# Patient Record
Sex: FEMALE | Race: WHITE | NOT HISPANIC OR LATINO
[De-identification: names, ages, dates, MRNs, and addresses within clinical notes are randomized per-mention and may not be internally consistent; named-entity substitution may affect disease eponyms.]

---

## 2018-02-26 ENCOUNTER — APPOINTMENT (OUTPATIENT)
Dept: SURGERY | Facility: CLINIC | Age: 18
End: 2018-02-26
Payer: COMMERCIAL

## 2018-02-26 ENCOUNTER — LABORATORY RESULT (OUTPATIENT)
Age: 18
End: 2018-02-26

## 2018-02-26 VITALS
BODY MASS INDEX: 23.3 KG/M2 | HEIGHT: 66 IN | DIASTOLIC BLOOD PRESSURE: 66 MMHG | WEIGHT: 145 LBS | SYSTOLIC BLOOD PRESSURE: 107 MMHG | HEART RATE: 86 BPM

## 2018-02-26 PROCEDURE — 99243 OFF/OP CNSLTJ NEW/EST LOW 30: CPT

## 2018-02-26 PROCEDURE — 10022: CPT

## 2018-02-26 PROCEDURE — 76942 ECHO GUIDE FOR BIOPSY: CPT

## 2018-08-29 ENCOUNTER — APPOINTMENT (OUTPATIENT)
Dept: SURGERY | Facility: CLINIC | Age: 18
End: 2018-08-29
Payer: COMMERCIAL

## 2018-08-29 PROCEDURE — 99214 OFFICE O/P EST MOD 30 MIN: CPT

## 2019-02-27 ENCOUNTER — APPOINTMENT (OUTPATIENT)
Dept: SURGERY | Facility: CLINIC | Age: 19
End: 2019-02-27
Payer: COMMERCIAL

## 2019-02-27 ENCOUNTER — LABORATORY RESULT (OUTPATIENT)
Age: 19
End: 2019-02-27

## 2019-02-27 PROCEDURE — 76942 ECHO GUIDE FOR BIOPSY: CPT | Mod: 59

## 2019-02-27 PROCEDURE — 99214 OFFICE O/P EST MOD 30 MIN: CPT | Mod: 25

## 2019-02-27 PROCEDURE — 10005 FNA BX W/US GDN 1ST LES: CPT

## 2019-02-27 NOTE — PHYSICAL EXAM
[de-identified] : no cervical or supraclavicular adenopathy. trachea midline, thyroid full without discreet nodule.  [Normal] : orientation to person, place, and time: normal

## 2019-02-27 NOTE — ASSESSMENT
[FreeTextEntry1] : increase in left lower nodule.  US guided FNA performed, if benign,  f/u US 8/2019 RTo 6 mo

## 2019-02-27 NOTE — HISTORY OF PRESENT ILLNESS
[de-identified] : Patient referred by Dr. Up for evaluation of MNG.  Patient with thyroid nodules noted on US 2012, no biopsy at that time.  Repeat US 2/2/18 right 5 mm nodule , left lower nodule 14 x 7 x 15 mm increased from prior study additional nodule noted. TSH 1.7 free T4 1.3, calcium 10.1.  Patient denies dysphagia, hoarseness or radiation exposure.  \par biopsy 2/2018 benign, f/u US 8/2018 stable MNG.  denies recent illness\par Patient with over 6 year hx of MNG with recent US 2/6/19 with MNG with increase in left lower nodule with suspicious characteristics.  Patient denies  symptoms.

## 2019-08-19 ENCOUNTER — APPOINTMENT (OUTPATIENT)
Dept: SURGERY | Facility: CLINIC | Age: 19
End: 2019-08-19
Payer: COMMERCIAL

## 2019-08-19 PROCEDURE — 99213 OFFICE O/P EST LOW 20 MIN: CPT

## 2019-08-19 NOTE — PHYSICAL EXAM
[Normal] : no neck adenopathy [de-identified] : no cervical or supraclavicular adenopathy. trachea midline, thyroid full without discreet nodule.

## 2019-08-19 NOTE — HISTORY OF PRESENT ILLNESS
[de-identified] : Patient referred by Dr. Up for evaluation of MNG.  Patient with thyroid nodules noted on US 2012, no biopsy at that time.  Repeat US 2/2/18 right 5 mm nodule , left lower nodule 14 x 7 x 15 mm increased from prior study additional nodule noted. TSH 1.7 free T4 1.3, calcium 10.1.  Patient denies dysphagia, hoarseness or radiation exposure.  \par biopsy 2/2018 benign, f/u US 8/2018 stable MNG.  denies recent illness\par Patient with over 6 year hx of MNG with recent US 2/6/19 with MNG with increase in left lower nodule with suspicious characteristics. benign biopsy 2/2019, patient  had f/u US 8/2019 stable MNG .   Patient denies  symptoms.

## 2020-02-26 ENCOUNTER — APPOINTMENT (OUTPATIENT)
Dept: SURGERY | Facility: CLINIC | Age: 20
End: 2020-02-26

## 2020-05-21 ENCOUNTER — APPOINTMENT (OUTPATIENT)
Dept: OPHTHALMOLOGY | Facility: CLINIC | Age: 20
End: 2020-05-21

## 2020-05-27 ENCOUNTER — APPOINTMENT (OUTPATIENT)
Dept: SURGERY | Facility: CLINIC | Age: 20
End: 2020-05-27
Payer: COMMERCIAL

## 2020-05-27 PROCEDURE — 99213 OFFICE O/P EST LOW 20 MIN: CPT

## 2020-05-27 NOTE — HISTORY OF PRESENT ILLNESS
[de-identified] : Patient referred by Dr. Up for evaluation of MNG.  Patient with thyroid nodules noted on US 2012, no biopsy at that time.  Repeat US 2/2/18 right 5 mm nodule , left lower nodule 14 x 7 x 15 mm increased from prior study additional nodule noted. TSH 1.7 free T4 1.3, calcium 10.1.  Patient denies dysphagia, hoarseness or radiation exposure.  \par biopsy 2/2018 benign, f/u US 8/2018 stable MNG.  denies recent illness\par Patient with over 8 year hx of MNG with recent US 2/6/19 with MNG with increase in left lower nodule with suspicious characteristics. benign biopsy 2/2019, patient  had f/u US 5/13/2020  stable MNG .   Patient denies  symptoms. patient due for PE with bishop , will forward results, denies fatigue or change in weight

## 2020-05-27 NOTE — PHYSICAL EXAM
[de-identified] : no cervical or supraclavicular adenopathy. trachea midline, thyroid full without discreet nodule.  [Normal] : no neck adenopathy [de-identified] : Skin:  normal appearance.  no rash, nodules, vesicles, or erythema,\par Musculoskeletal:  full range of motion and no deformities appreciated\par Neurological:  grossly intact\par Psychiatric:  oriented to person, place and time with appropriate affect

## 2020-08-18 ENCOUNTER — APPOINTMENT (OUTPATIENT)
Dept: OPHTHALMOLOGY | Facility: CLINIC | Age: 20
End: 2020-08-18
Payer: COMMERCIAL

## 2020-08-18 ENCOUNTER — NON-APPOINTMENT (OUTPATIENT)
Age: 20
End: 2020-08-18

## 2020-08-18 PROCEDURE — 92014 COMPRE OPH EXAM EST PT 1/>: CPT

## 2020-10-21 ENCOUNTER — RESULT REVIEW (OUTPATIENT)
Age: 20
End: 2020-10-21

## 2022-02-09 ENCOUNTER — APPOINTMENT (OUTPATIENT)
Dept: OBGYN | Facility: CLINIC | Age: 22
End: 2022-02-09
Payer: COMMERCIAL

## 2022-02-09 ENCOUNTER — ASOB RESULT (OUTPATIENT)
Age: 22
End: 2022-02-09

## 2022-02-09 VITALS
HEIGHT: 66 IN | DIASTOLIC BLOOD PRESSURE: 81 MMHG | SYSTOLIC BLOOD PRESSURE: 117 MMHG | BODY MASS INDEX: 23.3 KG/M2 | WEIGHT: 145 LBS

## 2022-02-09 PROCEDURE — 76856 US EXAM PELVIC COMPLETE: CPT

## 2022-02-09 PROCEDURE — 99213 OFFICE O/P EST LOW 20 MIN: CPT

## 2022-02-19 ENCOUNTER — APPOINTMENT (OUTPATIENT)
Dept: ULTRASOUND IMAGING | Facility: CLINIC | Age: 22
End: 2022-02-19

## 2022-02-22 ENCOUNTER — APPOINTMENT (OUTPATIENT)
Dept: SURGERY | Facility: CLINIC | Age: 22
End: 2022-02-22
Payer: COMMERCIAL

## 2022-02-22 PROCEDURE — 99213 OFFICE O/P EST LOW 20 MIN: CPT

## 2022-02-22 NOTE — PHYSICAL EXAM
[de-identified] : no cervical or supraclavicular adenopathy. trachea midline, thyroid full without discreet nodule.  [Normal] : no neck adenopathy [de-identified] : Skin:  normal appearance.  no rash, nodules, vesicles, or erythema,\par Musculoskeletal:  full range of motion and no deformities appreciated\par Neurological:  grossly intact\par Psychiatric:  oriented to person, place and time with appropriate affect

## 2022-02-22 NOTE — ASSESSMENT
[FreeTextEntry1] : minimal change in  MNG with prior FNA benign and probably reactive LNs.    f/u US 5/2022  if nodule continues to enlarge will plan repeat FNA.  RTo  6 mo.  I have answered their questions to the best of my ability.\par

## 2022-02-22 NOTE — HISTORY OF PRESENT ILLNESS
[de-identified] : Patient referred by Dr. Up for evaluation of MNG.  Patient with thyroid nodules noted on US 2012, no biopsy at that time.  Repeat US 2/2/18 right 5 mm nodule , left lower nodule 14 x 7 x 15 mm increased from prior study additional nodule noted. TSH 1.7 free T4 1.3, calcium 10.1.  Patient denies dysphagia, hoarseness or radiation exposure.  \par biopsy 2/2018 benign, f/u US 8/2018 stable MNG.  denies recent illness\par Patient with over 9 year hx of MNG with recent US 2/6/19 with MNG with increase in left lower nodule with suspicious characteristics. benign biopsy 2/2019, patient  had f/u US 5/13/2020  stable MNG .   Patient denies  symptoms, fatigue or change in weight.  f/u US 2/2022 with minimal change in dominant nodule and questionable LN.  denies recent illness.  I have reviewed all old and new data and available images.

## 2022-03-09 ENCOUNTER — APPOINTMENT (OUTPATIENT)
Dept: OBGYN | Facility: CLINIC | Age: 22
End: 2022-03-09

## 2022-03-14 ENCOUNTER — APPOINTMENT (OUTPATIENT)
Dept: OBGYN | Facility: CLINIC | Age: 22
End: 2022-03-14
Payer: COMMERCIAL

## 2022-03-14 VITALS
BODY MASS INDEX: 23.14 KG/M2 | WEIGHT: 144 LBS | SYSTOLIC BLOOD PRESSURE: 127 MMHG | HEIGHT: 66 IN | DIASTOLIC BLOOD PRESSURE: 83 MMHG

## 2022-03-14 PROCEDURE — 99395 PREV VISIT EST AGE 18-39: CPT

## 2022-03-14 RX ORDER — CLINDAMYCIN PHOSPHATE 10 MG/ML
1 LOTION TOPICAL
Qty: 60 | Refills: 0 | Status: ACTIVE | COMMUNITY
Start: 2022-01-24

## 2022-03-14 RX ORDER — MELOXICAM 15 MG/1
15 TABLET ORAL
Qty: 5 | Refills: 0 | Status: ACTIVE | COMMUNITY
Start: 2022-01-14

## 2022-03-14 RX ORDER — METRONIDAZOLE 7.5 MG/G
0.75 CREAM TOPICAL
Qty: 45 | Refills: 0 | Status: ACTIVE | COMMUNITY
Start: 2022-01-24

## 2022-03-14 NOTE — PHYSICAL EXAM

## 2022-03-14 NOTE — HISTORY OF PRESENT ILLNESS
[Patient reported PAP Smear was normal] : Patient reported PAP Smear was normal [No] : Patient does not have concerns regarding sex [Never active] : never active [FreeTextEntry1] : 03/14/2022. GORDY CATHERINE 21 year old female G0 LMP 3/7/22. She presents for an annual gyn exam and offers no complaints. \par Regular menses. Denies breakthrough bleeding, vaginal discharge and vaginitis sxs. Denies abdominal or pelvic pain. She has normal BMs. Denies bloody stool and urinary complaints. \par No new medical conditions, medications or surgeries. No known drug allergies.\par \par Pt has a 7.7 cm R ovarian cyst. She reports that the RLQ  pain is persistent since 2/9/22. She states that it started a couple months ago. She takes NSAIDs when she feels pain. \par \par Denies FHx of breast, ovarian, uterine or colon cancer. \par PMH: Hashimoto's, slightly enlarged thyroid nodule \par OBGYN: ovarian cyst \par SHx: denies \par Med: denies\par All: denies \par Social: denies \par \par For single pts, pt is not sexually active, virginl\par S/P HPV x 3\par \par  [PapSmeardate] : 2020

## 2022-03-14 NOTE — PLAN
[FreeTextEntry1] : 21 year old female pt presents for routine gyn exam\par Breast and pelvic exam performed\par Pap/HPV conducted\par Pt advised junel fe \par TVS after period\par s/s torsion or rupture\par Pt advised to call me if she has acute abdominal pain \par RTO in 1 year or PRN\par

## 2022-03-19 LAB — CYTOLOGY CVX/VAG DOC THIN PREP: NORMAL

## 2022-05-06 ENCOUNTER — NON-APPOINTMENT (OUTPATIENT)
Age: 22
End: 2022-05-06

## 2022-05-07 ENCOUNTER — NON-APPOINTMENT (OUTPATIENT)
Age: 22
End: 2022-05-07

## 2022-05-10 ENCOUNTER — ASOB RESULT (OUTPATIENT)
Age: 22
End: 2022-05-10

## 2022-05-10 ENCOUNTER — APPOINTMENT (OUTPATIENT)
Dept: OBGYN | Facility: CLINIC | Age: 22
End: 2022-05-10
Payer: COMMERCIAL

## 2022-05-10 PROCEDURE — 76856 US EXAM PELVIC COMPLETE: CPT

## 2022-05-10 PROCEDURE — 99214 OFFICE O/P EST MOD 30 MIN: CPT

## 2022-05-10 PROCEDURE — 99214 OFFICE O/P EST MOD 30 MIN: CPT | Mod: 25

## 2022-05-10 NOTE — PHYSICAL EXAM
[Soft] : soft [Non-distended] : non-distended [FreeTextEntry7] : slight right lower quad pain no rebound or guarding [Labia Majora] : normal [Labia Minora] : normal [Normal] : normal [Uterine Adnexae] : normal [FreeTextEntry5] : no cmt [FreeTextEntry6] : fullness right adnexae and mild tenderness

## 2022-05-10 NOTE — HISTORY OF PRESENT ILLNESS
[FreeTextEntry1] : 21 yr old  with knoen right ovarian cyst 7.5 cm with low level echoes with reports of right lower quadrant pain worsening needing to take motrin 2-3 a day.  Pt denies fever, nausea, vomiting or pain preventing work or sleep but report pain /10 and occ 7/10.  Pt has been on OCPS since 2022 and says pain still there and slightly worsening

## 2022-05-10 NOTE — PLAN
[FreeTextEntry1] : Persistent Right ovarian cyst with intermittent and worsening abd pain.\par \par TVS cyst stable and no obvious torsion and Kimber see some peripheral tissue and flow.  \par \par 1. Will get 2nd opinion TVS With Dr. Barton or Dr. Thomas r/o torsion-possilbe intermittent torsion d/w pt\par 2. torsion/rupture precautions given\par 3. cont OCPS \par 4. poss need for laparoscopic right ovarian cystectomy poss RSO if complete torsion found\par 5. close interval f/u in 2 months\par 6. will d/w Pt's mom Darcy Chapman

## 2022-05-11 ENCOUNTER — RESULT REVIEW (OUTPATIENT)
Age: 22
End: 2022-05-11

## 2022-05-11 ENCOUNTER — OUTPATIENT (OUTPATIENT)
Dept: OUTPATIENT SERVICES | Facility: HOSPITAL | Age: 22
LOS: 1 days | End: 2022-05-11
Payer: COMMERCIAL

## 2022-05-11 ENCOUNTER — APPOINTMENT (OUTPATIENT)
Dept: ULTRASOUND IMAGING | Facility: HOSPITAL | Age: 22
End: 2022-05-11

## 2022-05-11 DIAGNOSIS — N83.201 UNSPECIFIED OVARIAN CYST, RIGHT SIDE: ICD-10-CM

## 2022-05-11 PROCEDURE — 76856 US EXAM PELVIC COMPLETE: CPT

## 2022-05-11 PROCEDURE — 76856 US EXAM PELVIC COMPLETE: CPT | Mod: 26,59

## 2022-05-11 PROCEDURE — 93975 VASCULAR STUDY: CPT

## 2022-05-11 PROCEDURE — 93975 VASCULAR STUDY: CPT | Mod: 26

## 2022-05-25 ENCOUNTER — APPOINTMENT (OUTPATIENT)
Dept: OBGYN | Facility: CLINIC | Age: 22
End: 2022-05-25
Payer: COMMERCIAL

## 2022-05-25 VITALS — SYSTOLIC BLOOD PRESSURE: 120 MMHG | DIASTOLIC BLOOD PRESSURE: 81 MMHG

## 2022-05-25 PROCEDURE — 99214 OFFICE O/P EST MOD 30 MIN: CPT

## 2022-06-02 ENCOUNTER — OUTPATIENT (OUTPATIENT)
Dept: OUTPATIENT SERVICES | Facility: HOSPITAL | Age: 22
LOS: 1 days | End: 2022-06-02
Payer: COMMERCIAL

## 2022-06-02 VITALS
RESPIRATION RATE: 14 BRPM | DIASTOLIC BLOOD PRESSURE: 75 MMHG | TEMPERATURE: 98 F | HEIGHT: 66 IN | HEART RATE: 100 BPM | SYSTOLIC BLOOD PRESSURE: 110 MMHG | OXYGEN SATURATION: 97 % | WEIGHT: 145.06 LBS

## 2022-06-02 DIAGNOSIS — Z01.818 ENCOUNTER FOR OTHER PREPROCEDURAL EXAMINATION: ICD-10-CM

## 2022-06-02 DIAGNOSIS — N83.201 UNSPECIFIED OVARIAN CYST, RIGHT SIDE: ICD-10-CM

## 2022-06-02 LAB
HCT VFR BLD CALC: 40.7 % — SIGNIFICANT CHANGE UP (ref 34.5–45)
HGB BLD-MCNC: 13.4 G/DL — SIGNIFICANT CHANGE UP (ref 11.5–15.5)
MCHC RBC-ENTMCNC: 30.2 PG — SIGNIFICANT CHANGE UP (ref 27–34)
MCHC RBC-ENTMCNC: 32.9 GM/DL — SIGNIFICANT CHANGE UP (ref 32–36)
MCV RBC AUTO: 91.9 FL — SIGNIFICANT CHANGE UP (ref 80–100)
NRBC # BLD: 0 /100 WBCS — SIGNIFICANT CHANGE UP (ref 0–0)
PLATELET # BLD AUTO: 301 K/UL — SIGNIFICANT CHANGE UP (ref 150–400)
RBC # BLD: 4.43 M/UL — SIGNIFICANT CHANGE UP (ref 3.8–5.2)
RBC # FLD: 12 % — SIGNIFICANT CHANGE UP (ref 10.3–14.5)
WBC # BLD: 4.24 K/UL — SIGNIFICANT CHANGE UP (ref 3.8–10.5)
WBC # FLD AUTO: 4.24 K/UL — SIGNIFICANT CHANGE UP (ref 3.8–10.5)

## 2022-06-02 PROCEDURE — 87086 URINE CULTURE/COLONY COUNT: CPT

## 2022-06-02 PROCEDURE — 85027 COMPLETE CBC AUTOMATED: CPT

## 2022-06-02 PROCEDURE — G0463: CPT

## 2022-06-02 RX ORDER — GENTAMICIN SULFATE 40 MG/ML
330 VIAL (ML) INJECTION ONCE
Refills: 0 | Status: DISCONTINUED | OUTPATIENT
Start: 2022-06-07 | End: 2022-06-21

## 2022-06-02 RX ORDER — VANCOMYCIN HCL 1 G
1000 VIAL (EA) INTRAVENOUS ONCE
Refills: 0 | Status: DISCONTINUED | OUTPATIENT
Start: 2022-06-07 | End: 2022-06-21

## 2022-06-02 NOTE — H&P PST ADULT - PROBLEM SELECTOR PLAN 1
Laparoscopic right ovarian cystectomy on 6/7/22   PST labs pending  AC: None  Will obtain last cardiac evaluation-11/2021

## 2022-06-02 NOTE — H&P PST ADULT - NSICDXPASTMEDICALHX_GEN_ALL_CORE_FT
PAST MEDICAL HISTORY:  H/O Hashimoto thyroiditis     History of mitral valve prolapse     History of ovarian cyst

## 2022-06-02 NOTE — H&P PST ADULT - HISTORY OF PRESENT ILLNESS
20 y/o F with PMHx of MV prolapsed (mild, last Echo 3/2022), Hashimoto's thyroiditis (no meds, follow up ultrasound q6 months), and Hx of right ovarian cyst presents to PST c/o persistent RLQ pain since January 2022, which worsens w/ certain positions.  Now scheduled for Laparoscopic right ovarian cystectomy on 6/7/22.   Denies Hx of Covid-19 Infx.   Covid swab on 6/4/22

## 2022-06-02 NOTE — H&P PST ADULT - NS MD HP PULSE RADIAL
PRE-OP DIAGNOSIS:  Open wound of left foot 12-Aug-2019 12:31:52  Elena Alvarez
right normal/left normal

## 2022-06-03 LAB
CULTURE RESULTS: SIGNIFICANT CHANGE UP
SPECIMEN SOURCE: SIGNIFICANT CHANGE UP

## 2022-06-04 ENCOUNTER — OUTPATIENT (OUTPATIENT)
Dept: OUTPATIENT SERVICES | Facility: HOSPITAL | Age: 22
LOS: 1 days | End: 2022-06-04
Payer: COMMERCIAL

## 2022-06-04 DIAGNOSIS — Z11.52 ENCOUNTER FOR SCREENING FOR COVID-19: ICD-10-CM

## 2022-06-04 PROBLEM — Z87.42 PERSONAL HISTORY OF OTHER DISEASES OF THE FEMALE GENITAL TRACT: Chronic | Status: ACTIVE | Noted: 2022-06-02

## 2022-06-04 PROBLEM — Z86.39 PERSONAL HISTORY OF OTHER ENDOCRINE, NUTRITIONAL AND METABOLIC DISEASE: Chronic | Status: ACTIVE | Noted: 2022-06-02

## 2022-06-04 PROBLEM — Z86.79 PERSONAL HISTORY OF OTHER DISEASES OF THE CIRCULATORY SYSTEM: Chronic | Status: ACTIVE | Noted: 2022-06-02

## 2022-06-04 LAB — SARS-COV-2 RNA SPEC QL NAA+PROBE: SIGNIFICANT CHANGE UP

## 2022-06-04 PROCEDURE — U0005: CPT

## 2022-06-04 PROCEDURE — U0003: CPT

## 2022-06-04 PROCEDURE — C9803: CPT

## 2022-06-06 ENCOUNTER — TRANSCRIPTION ENCOUNTER (OUTPATIENT)
Age: 22
End: 2022-06-06

## 2022-06-07 ENCOUNTER — APPOINTMENT (OUTPATIENT)
Dept: OBGYN | Facility: CLINIC | Age: 22
End: 2022-06-07

## 2022-06-07 ENCOUNTER — TRANSCRIPTION ENCOUNTER (OUTPATIENT)
Age: 22
End: 2022-06-07

## 2022-06-07 ENCOUNTER — OUTPATIENT (OUTPATIENT)
Dept: OUTPATIENT SERVICES | Facility: HOSPITAL | Age: 22
LOS: 1 days | End: 2022-06-07
Payer: COMMERCIAL

## 2022-06-07 ENCOUNTER — RESULT REVIEW (OUTPATIENT)
Age: 22
End: 2022-06-07

## 2022-06-07 VITALS
WEIGHT: 145.06 LBS | SYSTOLIC BLOOD PRESSURE: 109 MMHG | RESPIRATION RATE: 18 BRPM | DIASTOLIC BLOOD PRESSURE: 73 MMHG | HEART RATE: 86 BPM | TEMPERATURE: 98 F | OXYGEN SATURATION: 98 % | HEIGHT: 66 IN

## 2022-06-07 VITALS
DIASTOLIC BLOOD PRESSURE: 65 MMHG | TEMPERATURE: 98 F | HEART RATE: 90 BPM | RESPIRATION RATE: 17 BRPM | OXYGEN SATURATION: 100 % | SYSTOLIC BLOOD PRESSURE: 111 MMHG

## 2022-06-07 DIAGNOSIS — N83.201 UNSPECIFIED OVARIAN CYST, RIGHT SIDE: ICD-10-CM

## 2022-06-07 LAB — HCG UR QL: NEGATIVE — SIGNIFICANT CHANGE UP

## 2022-06-07 PROCEDURE — 58662 LAPAROSCOPY EXCISE LESIONS: CPT

## 2022-06-07 PROCEDURE — 88305 TISSUE EXAM BY PATHOLOGIST: CPT | Mod: 26

## 2022-06-07 PROCEDURE — 88305 TISSUE EXAM BY PATHOLOGIST: CPT

## 2022-06-07 PROCEDURE — C1889: CPT

## 2022-06-07 PROCEDURE — 58925 REMOVAL OF OVARIAN CYST(S): CPT

## 2022-06-07 PROCEDURE — 52005 CYSTO W/URTRL CATHJ: CPT

## 2022-06-07 PROCEDURE — C1758: CPT

## 2022-06-07 PROCEDURE — 58925 REMOVAL OF OVARIAN CYST(S): CPT | Mod: 80

## 2022-06-07 PROCEDURE — 52332 CYSTOSCOPY AND TREATMENT: CPT | Mod: 50,XP

## 2022-06-07 PROCEDURE — 81025 URINE PREGNANCY TEST: CPT

## 2022-06-07 PROCEDURE — C1769: CPT

## 2022-06-07 DEVICE — URETERAL CATH SOF-FLEX OPEN END 6FR .040" X 70CM: Type: IMPLANTABLE DEVICE | Status: FUNCTIONAL

## 2022-06-07 DEVICE — GUIDEWIRE SENSOR DUAL-FLEX NITINOL STRAIGHT .035" X 150CM: Type: IMPLANTABLE DEVICE | Status: FUNCTIONAL

## 2022-06-07 DEVICE — ARISTA 3GR: Type: IMPLANTABLE DEVICE | Status: FUNCTIONAL

## 2022-06-07 DEVICE — URETERAL CATH WHISTLE TIP 5FR: Type: IMPLANTABLE DEVICE | Status: FUNCTIONAL

## 2022-06-07 RX ORDER — OXYCODONE HYDROCHLORIDE 5 MG/1
1 TABLET ORAL
Qty: 10 | Refills: 0
Start: 2022-06-07

## 2022-06-07 RX ORDER — DIPHENHYDRAMINE HCL 50 MG
25 CAPSULE ORAL ONCE
Refills: 0 | Status: DISCONTINUED | OUTPATIENT
Start: 2022-06-07 | End: 2022-06-21

## 2022-06-07 RX ORDER — PREGABALIN 225 MG/1
1 CAPSULE ORAL
Qty: 0 | Refills: 0 | DISCHARGE

## 2022-06-07 RX ORDER — OXYCODONE HYDROCHLORIDE 5 MG/1
5 TABLET ORAL ONCE
Refills: 0 | Status: DISCONTINUED | OUTPATIENT
Start: 2022-06-07 | End: 2022-06-07

## 2022-06-07 RX ORDER — ACETAMINOPHEN 500 MG
1000 TABLET ORAL ONCE
Refills: 0 | Status: COMPLETED | OUTPATIENT
Start: 2022-06-07 | End: 2022-06-07

## 2022-06-07 RX ORDER — IBUPROFEN 200 MG
1 TABLET ORAL
Qty: 0 | Refills: 0 | DISCHARGE

## 2022-06-07 RX ORDER — NORETHINDRONE AND ETHINYL ESTRADIOL 0.4-0.035
1 KIT ORAL
Qty: 0 | Refills: 0 | DISCHARGE

## 2022-06-07 RX ORDER — CELECOXIB 200 MG/1
400 CAPSULE ORAL ONCE
Refills: 0 | Status: COMPLETED | OUTPATIENT
Start: 2022-06-07 | End: 2022-06-07

## 2022-06-07 RX ORDER — SODIUM CHLORIDE 9 MG/ML
3 INJECTION INTRAMUSCULAR; INTRAVENOUS; SUBCUTANEOUS EVERY 8 HOURS
Refills: 0 | Status: DISCONTINUED | OUTPATIENT
Start: 2022-06-07 | End: 2022-06-07

## 2022-06-07 RX ORDER — ONDANSETRON 8 MG/1
8 TABLET, FILM COATED ORAL ONCE
Refills: 0 | Status: COMPLETED | OUTPATIENT
Start: 2022-06-07 | End: 2022-06-07

## 2022-06-07 RX ORDER — GABAPENTIN 400 MG/1
600 CAPSULE ORAL ONCE
Refills: 0 | Status: COMPLETED | OUTPATIENT
Start: 2022-06-07 | End: 2022-06-07

## 2022-06-07 RX ORDER — ACETAMINOPHEN 500 MG
3 TABLET ORAL
Qty: 0 | Refills: 0 | DISCHARGE

## 2022-06-07 RX ORDER — LIDOCAINE HCL 20 MG/ML
0.2 VIAL (ML) INJECTION ONCE
Refills: 0 | Status: DISCONTINUED | OUTPATIENT
Start: 2022-06-07 | End: 2022-06-07

## 2022-06-07 RX ORDER — SODIUM CHLORIDE 9 MG/ML
1000 INJECTION, SOLUTION INTRAVENOUS
Refills: 0 | Status: DISCONTINUED | OUTPATIENT
Start: 2022-06-07 | End: 2022-06-21

## 2022-06-07 RX ORDER — CHLORHEXIDINE GLUCONATE 213 G/1000ML
1 SOLUTION TOPICAL ONCE
Refills: 0 | Status: DISCONTINUED | OUTPATIENT
Start: 2022-06-07 | End: 2022-06-07

## 2022-06-07 RX ORDER — FENTANYL CITRATE 50 UG/ML
25 INJECTION INTRAVENOUS
Refills: 0 | Status: DISCONTINUED | OUTPATIENT
Start: 2022-06-07 | End: 2022-06-07

## 2022-06-07 RX ADMIN — FENTANYL CITRATE 25 MICROGRAM(S): 50 INJECTION INTRAVENOUS at 16:45

## 2022-06-07 RX ADMIN — FENTANYL CITRATE 25 MICROGRAM(S): 50 INJECTION INTRAVENOUS at 15:57

## 2022-06-07 RX ADMIN — GABAPENTIN 600 MILLIGRAM(S): 400 CAPSULE ORAL at 10:06

## 2022-06-07 RX ADMIN — FENTANYL CITRATE 25 MICROGRAM(S): 50 INJECTION INTRAVENOUS at 16:12

## 2022-06-07 RX ADMIN — FENTANYL CITRATE 25 MICROGRAM(S): 50 INJECTION INTRAVENOUS at 16:30

## 2022-06-07 RX ADMIN — ONDANSETRON 8 MILLIGRAM(S): 8 TABLET, FILM COATED ORAL at 17:15

## 2022-06-07 RX ADMIN — CELECOXIB 400 MILLIGRAM(S): 200 CAPSULE ORAL at 10:05

## 2022-06-07 RX ADMIN — Medication 1000 MILLIGRAM(S): at 10:49

## 2022-06-07 RX ADMIN — FENTANYL CITRATE 25 MICROGRAM(S): 50 INJECTION INTRAVENOUS at 15:42

## 2022-06-07 NOTE — PROGRESS NOTE ADULT - SUBJECTIVE AND OBJECTIVE BOX
GYN POST-OP CHECK  GORDY CATHERINE  2000    Allergies  cefazolin (Rash)    Intolerances  Denies    S: Pt awake and alert resting comfortably in recliner chair. Pain controlled. Pt denies N/V, SOB, CP, palpitations. Tolerates clears.  Not OOB yet.    O:   T(C): 36 (06-07-22 @ 16:00), Max: 36 (06-07-22 @ 15:05)  HR: 77 (06-07-22 @ 17:45) (63 - 84)  BP: 106/61 (06-07-22 @ 17:45) (93/74 - 115/71)  RR: 18 (06-07-22 @ 17:45) (18 - 18)  SpO2: 100% (06-07-22 @ 17:45) (97% - 100%)  I&O's Summary      Gen: NAD. A+Ox3.  CV: S1S2, RRR  Lungs: CTA B/L  Abd: +BS. soft, mildy distended and appropriately tender.  Inc: Clean/dry/intact  Ext: PAS in place    A/P: 21y Female  PAST MEDICAL & SURGICAL HISTORY:  H/O Hashimoto thyroiditis  History of mitral valve prolapse  History of ovarian cyst  No significant past surgical history      now POD#0 s/p laparoscopic R ovarian cystectomy, b/l ucaths    1. Neuro: Analgesia PRN. fentaNYL    Injectable 25 MICROGram(s) IV Push every 5 minutes PRN  oxyCODONE    IR 5 milliGRAM(s) Oral once PRN     2. Card: Monitor VS.  3. Pulm: Incentive spirometer use. Continue diphenhydrAMINE Injectable 25 milliGRAM(s) IV Push once PRN    4. GI: Advance to regular diet. Anti-emetics PRN.  5. : DTV by 11pm  6. Electrolytes: LR@75cc/hr.   7. DVT ppx w/ PAS while in bed. Early ambulation, initially with assistance then as tolerated.  8. Discharge from PACU when criteria met.     Sheri Wynne PA-C

## 2022-06-07 NOTE — ASU DISCHARGE PLAN (ADULT/PEDIATRIC) - NS MD DC FALL RISK RISK
For information on Fall & Injury Prevention, visit: [email or web address] OR  [email or web address] OR  https://www.cdc.gov/steadi/patient.html

## 2022-06-07 NOTE — ASU PREOP CHECKLIST - BMI (KG/M2)
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  I had a long conversation with the patient and his spouse and they understand.  Fall precautions were discussed. 23.4

## 2022-06-07 NOTE — ASU DISCHARGE PLAN (ADULT/PEDIATRIC) - CARE PROVIDER_API CALL
Samantha Byrd)  Obstetrics and Gynecology  [address] [place] [address]  Phone: [phone]  Fax: [phone]  Follow Up Time:

## 2022-06-07 NOTE — BRIEF OPERATIVE NOTE - OPERATION/FINDINGS
EUA: Small, mobile uterus. R adnexal fullness  Laparoscopy: Diffuse endometriotic lesions noted on uterus, bilateral ovaries, bilateral fallopian tubes, pelvic side walls, bladder peritoneum, omentum, bowel. Otherwise grossly normal uterus and bilateral tubes. Normal left ovary. R ovary with approximately 7cm smooth walled cyst, ruptured intra-op with dark brown fluid noted.

## 2022-06-07 NOTE — BRIEF OPERATIVE NOTE - COMMENTS
Bilateral U-Caths placed by urology and removed in OR immediately following procedure  Portia placed on ovarian dissection bed for hemostasis

## 2022-06-14 LAB — SURGICAL PATHOLOGY STUDY: SIGNIFICANT CHANGE UP

## 2022-06-23 ENCOUNTER — APPOINTMENT (OUTPATIENT)
Dept: OBGYN | Facility: CLINIC | Age: 22
End: 2022-06-23

## 2022-06-23 VITALS — SYSTOLIC BLOOD PRESSURE: 124 MMHG | DIASTOLIC BLOOD PRESSURE: 70 MMHG

## 2022-06-23 DIAGNOSIS — Z09 ENCOUNTER FOR FOLLOW-UP EXAMINATION AFTER COMPLETED TREATMENT FOR CONDITIONS OTHER THAN MALIGNANT NEOPLASM: ICD-10-CM

## 2022-06-23 PROCEDURE — 99213 OFFICE O/P EST LOW 20 MIN: CPT | Mod: 24

## 2022-07-05 ENCOUNTER — NON-APPOINTMENT (OUTPATIENT)
Age: 22
End: 2022-07-05

## 2022-07-11 ENCOUNTER — APPOINTMENT (OUTPATIENT)
Dept: OBGYN | Facility: CLINIC | Age: 22
End: 2022-07-11

## 2022-07-12 ENCOUNTER — APPOINTMENT (OUTPATIENT)
Dept: OBGYN | Facility: CLINIC | Age: 22
End: 2022-07-12

## 2022-07-12 VITALS
WEIGHT: 144 LBS | HEIGHT: 66 IN | BODY MASS INDEX: 23.14 KG/M2 | SYSTOLIC BLOOD PRESSURE: 134 MMHG | DIASTOLIC BLOOD PRESSURE: 79 MMHG

## 2022-07-12 DIAGNOSIS — N83.201 UNSPECIFIED OVARIAN CYST, RIGHT SIDE: ICD-10-CM

## 2022-07-12 DIAGNOSIS — N80.1 ENDOMETRIOSIS OF OVARY: ICD-10-CM

## 2022-07-12 PROCEDURE — 99213 OFFICE O/P EST LOW 20 MIN: CPT | Mod: 24

## 2022-09-29 ENCOUNTER — APPOINTMENT (OUTPATIENT)
Dept: SURGERY | Facility: CLINIC | Age: 22
End: 2022-09-29

## 2022-09-29 PROCEDURE — 99213 OFFICE O/P EST LOW 20 MIN: CPT

## 2022-09-29 NOTE — HISTORY OF PRESENT ILLNESS
[de-identified] : Patient referred by Dr. Up for evaluation of MNG.  Patient with thyroid nodules noted on US 2012, no biopsy at that time.  Repeat US 2/2/18 right 5 mm nodule , left lower nodule 14 x 7 x 15 mm increased from prior study additional nodule noted. TSH 1.7 free T4 1.3, calcium 10.1.  Patient denies dysphagia, hoarseness or radiation exposure.  \par biopsy 2/2018 benign, f/u US 8/2018 stable MNG.  denies recent illness\par Patient with over 10 year hx of MNG with recent US 2/6/19 with MNG with increase in left lower nodule with suspicious characteristics. benign biopsy 2/2019, patient  had f/u US 5/13/2020  stable MNG .   f/u US 2/2022 with minimal change in dominant nodule and questionable LN. ultrasound May 2022 with stable nodule and decrease in lymph node.  Denies recent illness.  I have reviewed all old and new data and available images.

## 2022-09-29 NOTE — ASSESSMENT
[FreeTextEntry1] : minimal change in  MNG with prior FNA benign and probably reactive LNs.    f/u US 5/2022 stable.  repeat US 3/2023  if stable   RTo  1 year  I have answered their questions to the best of my ability.\par

## 2023-03-02 ENCOUNTER — NON-APPOINTMENT (OUTPATIENT)
Age: 23
End: 2023-03-02

## 2023-03-06 ENCOUNTER — APPOINTMENT (OUTPATIENT)
Dept: ULTRASOUND IMAGING | Facility: CLINIC | Age: 23
End: 2023-03-06
Payer: COMMERCIAL

## 2023-03-06 ENCOUNTER — RX RENEWAL (OUTPATIENT)
Age: 23
End: 2023-03-06

## 2023-03-06 PROCEDURE — 76856 US EXAM PELVIC COMPLETE: CPT

## 2023-03-08 ENCOUNTER — APPOINTMENT (OUTPATIENT)
Dept: OBGYN | Facility: CLINIC | Age: 23
End: 2023-03-08
Payer: COMMERCIAL

## 2023-03-08 VITALS
WEIGHT: 155 LBS | HEIGHT: 67 IN | DIASTOLIC BLOOD PRESSURE: 73 MMHG | SYSTOLIC BLOOD PRESSURE: 112 MMHG | BODY MASS INDEX: 24.33 KG/M2

## 2023-03-08 PROCEDURE — 99213 OFFICE O/P EST LOW 20 MIN: CPT

## 2023-03-08 NOTE — PLAN
[FreeTextEntry1] : 22 year old female pt presents for acute visit for pelvic pain:\par \par Pelvic pain:\par could be related to h/o endometriosis\par d/w pt [place], orilissa r/b/a\par Rx given for orilissa 1x daily\par Orilissa sample given\par pt to monitor pain - if no improvement on orilissa, will consider pelvic MRI \par \par RTO in 1 month for annual and pelvic pain f/u, or PRN

## 2023-03-08 NOTE — HISTORY OF PRESENT ILLNESS
[FreeTextEntry1] : 03/08/2023. 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 She reports pinching throbbing pain across pelvis, radiating pain to L side. She reports similarity to cyst-related pain, denies association w/ menses. She is virginal. Denies N/V. Denies intermenstrual bleeding, abn discharge or vaginitis sxs. No urinary complaints. She has normal BM, no bloody stool. She denies abdominal or pelvic pain.\par \par SHx: 6/2022 laparoscopy for R endometrioma and stage 3 endometriosis\par 5/2022 TVUS - normal uterus, R ovary 7.5 cm hemorrhagic cyst, no significant size change\par \par TVS today nl uterus, and nl ovaries\par \par

## 2023-03-28 ENCOUNTER — APPOINTMENT (OUTPATIENT)
Dept: OBGYN | Facility: CLINIC | Age: 23
End: 2023-03-28
Payer: COMMERCIAL

## 2023-03-28 VITALS
HEIGHT: 67 IN | DIASTOLIC BLOOD PRESSURE: 79 MMHG | BODY MASS INDEX: 24.33 KG/M2 | SYSTOLIC BLOOD PRESSURE: 113 MMHG | WEIGHT: 155 LBS

## 2023-03-28 DIAGNOSIS — R10.2 PELVIC AND PERINEAL PAIN: ICD-10-CM

## 2023-03-28 PROCEDURE — 99395 PREV VISIT EST AGE 18-39: CPT

## 2023-03-28 RX ORDER — ELAGOLIX 150 MG/1
150 TABLET, FILM COATED ORAL
Qty: 90 | Refills: 3 | Status: DISCONTINUED | COMMUNITY
Start: 2023-03-08 | End: 2023-03-28

## 2023-03-28 RX ORDER — NORETHINDRONE ACETATE AND ETHINYL ESTRADIOL AND FERROUS FUMARATE 1MG-20(21)
1-20 KIT ORAL
Qty: 84 | Refills: 3 | Status: ACTIVE | COMMUNITY
Start: 2022-03-14 | End: 1900-01-01

## 2023-08-23 ENCOUNTER — APPOINTMENT (OUTPATIENT)
Dept: OBGYN | Facility: CLINIC | Age: 23
End: 2023-08-23

## 2023-08-29 ENCOUNTER — APPOINTMENT (OUTPATIENT)
Dept: SURGERY | Facility: CLINIC | Age: 23
End: 2023-08-29
Payer: COMMERCIAL

## 2023-08-29 DIAGNOSIS — E06.3 AUTOIMMUNE THYROIDITIS: ICD-10-CM

## 2023-08-29 PROCEDURE — 99213 OFFICE O/P EST LOW 20 MIN: CPT

## 2023-08-29 NOTE — ASSESSMENT
[FreeTextEntry1] : minimal change in  MNG with prior FNA benign    f/u US 5/2022 stable.  repeat US now, patient will contact office to review, if stable   RTo  1 year  I have answered their questions to the best of my ability.

## 2023-08-29 NOTE — PHYSICAL EXAM
[de-identified] : no cervical or supraclavicular adenopathy. trachea midline, thyroid full without discreet nodule.  [Normal] : no neck adenopathy [de-identified] : Skin:  normal appearance.  no rash, nodules, vesicles, or erythema,\par  Musculoskeletal:  full range of motion and no deformities appreciated\par  Neurological:  grossly intact\par  Psychiatric:  oriented to person, place and time with appropriate affect

## 2023-08-29 NOTE — HISTORY OF PRESENT ILLNESS
[de-identified] : Patient referred by Dr. Up for evaluation of MNG.  Patient with thyroid nodules noted on US 2012, no biopsy at that time.  Repeat US 2/2/18 right 5 mm nodule , left lower nodule 14 x 7 x 15 mm increased from prior study additional nodule noted. TSH 1.7 free T4 1.3, calcium 10.1.  Patient denies dysphagia, hoarseness or radiation exposure.   biopsy 2/2018 benign, f/u US 8/2018 stable MNG.  denies recent illness Patient with over 11 year hx of MNG with recent US 2/6/19 with MNG with increase in left lower nodule with suspicious characteristics. benign biopsy 2/2019, patient  had f/u US 5/13/2020  stable MNG .   f/u US 2/2022 with minimal change in dominant nodule and questionable LN. ultrasound May 2022 with stable nodule and decrease in lymph node.  Denies recent illness.  I have reviewed all old and new data and available images.

## 2023-09-06 ENCOUNTER — EMERGENCY (EMERGENCY)
Facility: HOSPITAL | Age: 23
LOS: 1 days | Discharge: HOME | End: 2023-09-06
Admitting: EMERGENCY MEDICINE
Payer: COMMERCIAL

## 2023-09-06 VITALS
RESPIRATION RATE: 18 BRPM | TEMPERATURE: 98 F | OXYGEN SATURATION: 100 % | HEART RATE: 84 BPM | SYSTOLIC BLOOD PRESSURE: 132 MMHG | DIASTOLIC BLOOD PRESSURE: 95 MMHG

## 2023-09-06 PROCEDURE — 99284 EMERGENCY DEPT VISIT MOD MDM: CPT

## 2023-09-06 PROCEDURE — 93010 ELECTROCARDIOGRAM REPORT: CPT

## 2023-09-06 NOTE — ED ADULT TRIAGE NOTE - CHIEF COMPLAINT QUOTE
Pt c/o numbness and tingling to hands and feet and R calf since Monday. Also c/o pain to bilateral axillas. Denies CP, SOB, palpitations, N/V, fevers/chills. No neuro deficits noted. PMHx Enlarged thyroid nodule

## 2023-09-07 VITALS
HEART RATE: 86 BPM | SYSTOLIC BLOOD PRESSURE: 108 MMHG | TEMPERATURE: 98 F | DIASTOLIC BLOOD PRESSURE: 80 MMHG | RESPIRATION RATE: 18 BRPM | OXYGEN SATURATION: 100 %

## 2023-09-07 LAB
ALBUMIN SERPL ELPH-MCNC: 4.2 G/DL — SIGNIFICANT CHANGE UP (ref 3.3–5)
ALP SERPL-CCNC: 84 U/L — SIGNIFICANT CHANGE UP (ref 40–120)
ALT FLD-CCNC: 13 U/L — SIGNIFICANT CHANGE UP (ref 4–33)
ANION GAP SERPL CALC-SCNC: 11 MMOL/L — SIGNIFICANT CHANGE UP (ref 7–14)
AST SERPL-CCNC: 26 U/L — SIGNIFICANT CHANGE UP (ref 4–32)
BASOPHILS # BLD AUTO: 0.08 K/UL — SIGNIFICANT CHANGE UP (ref 0–0.2)
BASOPHILS NFR BLD AUTO: 1.1 % — SIGNIFICANT CHANGE UP (ref 0–2)
BILIRUB SERPL-MCNC: 0.2 MG/DL — SIGNIFICANT CHANGE UP (ref 0.2–1.2)
BUN SERPL-MCNC: 5 MG/DL — LOW (ref 7–23)
CALCIUM SERPL-MCNC: 9.5 MG/DL — SIGNIFICANT CHANGE UP (ref 8.4–10.5)
CHLORIDE SERPL-SCNC: 107 MMOL/L — SIGNIFICANT CHANGE UP (ref 98–107)
CO2 SERPL-SCNC: 23 MMOL/L — SIGNIFICANT CHANGE UP (ref 22–31)
CREAT SERPL-MCNC: 0.62 MG/DL — SIGNIFICANT CHANGE UP (ref 0.5–1.3)
EGFR: 127 ML/MIN/1.73M2 — SIGNIFICANT CHANGE UP
EOSINOPHIL # BLD AUTO: 0.15 K/UL — SIGNIFICANT CHANGE UP (ref 0–0.5)
EOSINOPHIL NFR BLD AUTO: 2.2 % — SIGNIFICANT CHANGE UP (ref 0–6)
GLUCOSE SERPL-MCNC: 97 MG/DL — SIGNIFICANT CHANGE UP (ref 70–99)
HCT VFR BLD CALC: 39.7 % — SIGNIFICANT CHANGE UP (ref 34.5–45)
HGB BLD-MCNC: 13.2 G/DL — SIGNIFICANT CHANGE UP (ref 11.5–15.5)
IANC: 3.64 K/UL — SIGNIFICANT CHANGE UP (ref 1.8–7.4)
IMM GRANULOCYTES NFR BLD AUTO: 0.1 % — SIGNIFICANT CHANGE UP (ref 0–0.9)
LYMPHOCYTES # BLD AUTO: 2.42 K/UL — SIGNIFICANT CHANGE UP (ref 1–3.3)
LYMPHOCYTES # BLD AUTO: 34.8 % — SIGNIFICANT CHANGE UP (ref 13–44)
MCHC RBC-ENTMCNC: 30.4 PG — SIGNIFICANT CHANGE UP (ref 27–34)
MCHC RBC-ENTMCNC: 33.2 GM/DL — SIGNIFICANT CHANGE UP (ref 32–36)
MCV RBC AUTO: 91.5 FL — SIGNIFICANT CHANGE UP (ref 80–100)
MONOCYTES # BLD AUTO: 0.66 K/UL — SIGNIFICANT CHANGE UP (ref 0–0.9)
MONOCYTES NFR BLD AUTO: 9.5 % — SIGNIFICANT CHANGE UP (ref 2–14)
NEUTROPHILS # BLD AUTO: 3.64 K/UL — SIGNIFICANT CHANGE UP (ref 1.8–7.4)
NEUTROPHILS NFR BLD AUTO: 52.3 % — SIGNIFICANT CHANGE UP (ref 43–77)
NRBC # BLD: 0 /100 WBCS — SIGNIFICANT CHANGE UP (ref 0–0)
NRBC # FLD: 0 K/UL — SIGNIFICANT CHANGE UP (ref 0–0)
PLATELET # BLD AUTO: 278 K/UL — SIGNIFICANT CHANGE UP (ref 150–400)
POTASSIUM SERPL-MCNC: 4.1 MMOL/L — SIGNIFICANT CHANGE UP (ref 3.5–5.3)
POTASSIUM SERPL-SCNC: 4.1 MMOL/L — SIGNIFICANT CHANGE UP (ref 3.5–5.3)
PROT SERPL-MCNC: 7.2 G/DL — SIGNIFICANT CHANGE UP (ref 6–8.3)
RBC # BLD: 4.34 M/UL — SIGNIFICANT CHANGE UP (ref 3.8–5.2)
RBC # FLD: 12 % — SIGNIFICANT CHANGE UP (ref 10.3–14.5)
SODIUM SERPL-SCNC: 141 MMOL/L — SIGNIFICANT CHANGE UP (ref 135–145)
TSH SERPL-MCNC: 4.79 UIU/ML — HIGH (ref 0.27–4.2)
VIT B12 SERPL-MCNC: 1171 PG/ML — HIGH (ref 200–900)
WBC # BLD: 6.96 K/UL — SIGNIFICANT CHANGE UP (ref 3.8–10.5)
WBC # FLD AUTO: 6.96 K/UL — SIGNIFICANT CHANGE UP (ref 3.8–10.5)

## 2023-09-07 NOTE — ED PROVIDER NOTE - NSFOLLOWUPINSTRUCTIONS_ED_ALL_ED_FT
Paresthesia    WHAT YOU NEED TO KNOW:    Paresthesia is numbness, tingling, or burning. It can happen in any part of your body, but usually occurs in your legs, feet, arms, or hands.    DISCHARGE INSTRUCTIONS:    Return to the emergency department if:    You have severe pain along with numbness and tingling.    Your legs suddenly become cold. You have trouble moving your legs, and they ache.    You have increased weakness in a part of your body.    You have uncontrolled movements.  Contact your healthcare provider or neurologist if:    Your symptoms do not improve.    You have symptoms in more than one part of your body.    You have questions or concerns about your condition or care.  Manage paresthesia:    Protect the area from injury. You may injure or burn yourself if you lose feeling in the area. Be careful when you touch anything that could be hot. Wear sturdy shoes to protect your feet. Ask about other ways to protect yourself.    Go to physical or occupational therapy if directed. Your provider may recommend therapy if you have a condition such as carpal tunnel syndrome. A physical therapist can teach you exercises to help strengthen the area or increase your ability to move it. An occupational therapist can help you find new ways to do your daily activities.    Manage health conditions that can cause paresthesia. Work with your diabetes specialist if you have uncontrolled diabetes. A dietitian or your healthcare provider can help you create a meal plan if you have low vitamin B levels. Your provider can help you manage your health if you have multiple sclerosis or you had a stroke. It is important to manage health conditions to stop paresthesia or prevent it from getting worse.  Follow up with your healthcare provider or neurologist as directed: Your healthcare provider may refer you to a specialist. Write down your questions so you remember to ask them during your visits.    Parestesia    LO QUE NECESITA SABER:    La parestesia es laura sensación de adormecimiento, hormigueo o ardor. Puede ocurrir en cualquier parte de triana cuerpo, alcira generalmente se produce en las piernas, pies, brazos o bismark.    INSTRUCCIONES SOBRE EL MELISSA HOSPITALARIA:    Regrese a la christal de emergencias si:    Tiene dolor grave junto con entumecimiento y hormigueo.    Rikki piernas se ponen frías súbitamente. Tiene dificultad para  las piernas y éstas le duelen.    Tiene mayor debilidad en laura parte de triana cuerpo.    Tiene movimientos descontrolados.  Comuníquese con triana médico o neurólogo si:    Rikki síntomas no mejoran.    Tiene síntomas en más de laura parte del cuerpo.    Usted tiene preguntas o inquietudes acerca de triana condición o cuidado.  Manejo de la parestesia:    Proteja el área de lesiones.Puede lesionarse o quemarse si pierde la sensibilidad en la kell. Tenga cuidado al tocar cualquier cosa que podría estar caliente. Use zapatos resistentes para protegerse los pies. Consulte sobre otras formas de protegerse.    Acuda a fisioterapia o terapia ocupacional si se lo indican.Triana médico puede recomendar tratamiento si usted tiene laura afección, jen el síndrome del túnel carpiano. Un fisioterapeuta puede enseñarle ejercicios para ayudar a fortalecer la kell o aumentar triana capacidad para moverla. Un terapeuta ocupacional puede ayudarlo a encontrar nuevas formas de hacer rikki actividades diarias.    Mantenga bajo control las afecciones que pueden causar la parestesia.Trabaje con triana especialista en diabetes si tiene diabetes no controlada. Un dietista o triana médico puede ayudarlo a crear un plan de alimentación si rikki niveles de vitamina B son bajos. Triana médico puede ayudarlo a mantener triana sonal bajo control si tiene esclerosis múltiple o tuvo un accidente cerebrovascular. Es importante mantener las afecciones bajo control para detener la parestesia o evitar que empeore.  Programe laura rony con triana médico o triana neurólogo jen se le indique:Triana médico también podría derivarlo a un especialista. Anote rikki preguntas para que se acuerde de hacerlas loni rikki visitas.

## 2023-09-07 NOTE — ED ADULT NURSE NOTE - DISCHARGE DATE/TIME
07-Sep-2023 04:23 Post-Care Instructions: I reviewed with the patient in detail post-care instructions. Patient is not to engage in any heavy lifting, exercise, or swimming for the next 14 days. Should the patient develop any fevers, chills, bleeding, severe pain patient will contact the office immediately.

## 2023-09-07 NOTE — ED PROVIDER NOTE - PATIENT PORTAL LINK FT
You can access the FollowMyHealth Patient Portal offered by [facility] by registering at the following website: [email or web address]. By joining [facility]’s FollowMyHealth portal, you will also be able to view your health information using other applications (apps) compatible with our system.

## 2023-09-07 NOTE — ED ADULT NURSE NOTE - OBJECTIVE STATEMENT
pt received in intake 15. pt is AAOX4 and ambulatory. pt c/o arm and leg numbness since Monday. pt endorses lightheadedness. pt PMH of mitral valve prolase, ovarian cysts and hypothyroidism. pt denies chest pain, SOB, fever/chills. pt denies recent travel and sick contact. pt RR are even and unlabored. pt has 20g in LAC, labs drawn and sent. Mother at bedside. Ongoing eval in progress.

## 2023-09-07 NOTE — ED PROVIDER NOTE - OBJECTIVE STATEMENT
21 y/o female hx endometriosis, thyroid nodule presents to ER w/ multiple complaints. Pt. states last week developed pain to her bl exillary region which radiates to bl elbows - saw her PMD and was told possible nerve issue. Pt. states a few days ago started to develoipe bl ue intermittent numbness and tingling as well as bl calf/foot paresthesias as well. pt was prescribed diclofenac by pcp two days ago and only took one dose yesterday. Denies cp sob fever chills weakness dizziness loc.

## 2023-09-07 NOTE — ED PROVIDER NOTE - CLINICAL SUMMARY MEDICAL DECISION MAKING FREE TEXT BOX
21 y/o female c/o bl ue le numbness tinling and bl axillary pain  -unlcear etiology - possible radiculopathy vs r/o elctrolyte dysfunction vs thyroid dysfunction  -labs tsh  -reassess

## 2023-09-07 NOTE — ED ADULT NURSE NOTE - NSFALLUNIVINTERV_ED_ALL_ED
Bed/Stretcher in lowest position, wheels locked, appropriate side rails in place/Call bell, personal items and telephone in reach/Instruct patient to call for assistance before getting out of bed/chair/stretcher/Non-slip footwear applied when patient is off stretcher/[place] to call system/Physically safe environment - no spills, clutter or unnecessary equipment/Purposeful proactive rounding/Room/bathroom lighting operational, light cord in reach

## 2023-10-17 NOTE — ASU PATIENT PROFILE, ADULT - ABILITY TO HEAR (WITH HEARING AID OR HEARING APPLIANCE IF NORMALLY USED):
The sutures placed should dissolve on their own in approximately 5 to 7 days.  Leave this initial bandage on until tomorrow evening and then only cover the wound when in dirty areas, leave open to air is much as possible.  Do not use any over-the-counter antibiotic ointments.  You may clean the area gently with antibacterial soap and water and then pat dry.  For signs of infection such as increased pain redness swelling or drainage please follow-up with your primary care physician or return.  After the wound has totally healed remember to use daily sunscreen to help prevent further scarring.  
Adequate: hears normal conversation without difficulty

## 2023-10-27 ENCOUNTER — NON-APPOINTMENT (OUTPATIENT)
Age: 23
End: 2023-10-27

## 2023-10-31 ENCOUNTER — NON-APPOINTMENT (OUTPATIENT)
Age: 23
End: 2023-10-31

## 2023-11-07 ENCOUNTER — APPOINTMENT (OUTPATIENT)
Dept: OBGYN | Facility: CLINIC | Age: 23
End: 2023-11-07
Payer: COMMERCIAL

## 2023-11-07 PROCEDURE — 99213 OFFICE O/P EST LOW 20 MIN: CPT | Mod: 95

## 2023-11-21 ENCOUNTER — ASOB RESULT (OUTPATIENT)
Age: 23
End: 2023-11-21

## 2023-11-21 ENCOUNTER — APPOINTMENT (OUTPATIENT)
Dept: OBGYN | Facility: CLINIC | Age: 23
End: 2023-11-21
Payer: COMMERCIAL

## 2023-11-21 PROCEDURE — 76856 US EXAM PELVIC COMPLETE: CPT

## 2023-12-08 ENCOUNTER — NON-APPOINTMENT (OUTPATIENT)
Age: 23
End: 2023-12-08

## 2023-12-08 ENCOUNTER — APPOINTMENT (OUTPATIENT)
Dept: OPHTHALMOLOGY | Facility: CLINIC | Age: 23
End: 2023-12-08
Payer: COMMERCIAL

## 2023-12-08 PROCEDURE — 92015 DETERMINE REFRACTIVE STATE: CPT

## 2023-12-08 PROCEDURE — 76514 ECHO EXAM OF EYE THICKNESS: CPT

## 2023-12-08 PROCEDURE — 92004 COMPRE OPH EXAM NEW PT 1/>: CPT

## 2023-12-08 PROCEDURE — 92133 CPTRZD OPH DX IMG PST SGM ON: CPT

## 2023-12-13 ENCOUNTER — APPOINTMENT (OUTPATIENT)
Dept: OBGYN | Facility: CLINIC | Age: 23
End: 2023-12-13
Payer: COMMERCIAL

## 2023-12-13 VITALS — DIASTOLIC BLOOD PRESSURE: 84 MMHG | SYSTOLIC BLOOD PRESSURE: 135 MMHG

## 2023-12-13 DIAGNOSIS — N94.10 UNSPECIFIED DYSPAREUNIA: ICD-10-CM

## 2023-12-13 LAB — HCG UR QL: NEGATIVE

## 2023-12-13 PROCEDURE — 99213 OFFICE O/P EST LOW 20 MIN: CPT | Mod: 25

## 2023-12-13 PROCEDURE — 81025 URINE PREGNANCY TEST: CPT

## 2023-12-13 RX ORDER — ESTRADIOL 0.1 MG/G
0.1 CREAM VAGINAL
Qty: 1 | Refills: 0 | Status: ACTIVE | COMMUNITY
Start: 2023-12-13 | End: 1900-01-01

## 2023-12-18 ENCOUNTER — TRANSCRIPTION ENCOUNTER (OUTPATIENT)
Age: 23
End: 2023-12-18

## 2024-02-07 ENCOUNTER — APPOINTMENT (OUTPATIENT)
Dept: OBGYN | Facility: CLINIC | Age: 24
End: 2024-02-07

## 2024-02-28 ENCOUNTER — APPOINTMENT (OUTPATIENT)
Dept: OBGYN | Facility: CLINIC | Age: 24
End: 2024-02-28
Payer: COMMERCIAL

## 2024-02-28 VITALS — DIASTOLIC BLOOD PRESSURE: 83 MMHG | SYSTOLIC BLOOD PRESSURE: 124 MMHG

## 2024-02-28 DIAGNOSIS — N63.0 UNSPECIFIED LUMP IN UNSPECIFIED BREAST: ICD-10-CM

## 2024-02-28 PROCEDURE — 99213 OFFICE O/P EST LOW 20 MIN: CPT

## 2024-04-02 ENCOUNTER — ASOB RESULT (OUTPATIENT)
Age: 24
End: 2024-04-02

## 2024-04-02 ENCOUNTER — APPOINTMENT (OUTPATIENT)
Dept: OBGYN | Facility: CLINIC | Age: 24
End: 2024-04-02
Payer: COMMERCIAL

## 2024-04-02 VITALS
HEIGHT: 67 IN | WEIGHT: 162 LBS | BODY MASS INDEX: 25.43 KG/M2 | DIASTOLIC BLOOD PRESSURE: 86 MMHG | SYSTOLIC BLOOD PRESSURE: 126 MMHG

## 2024-04-02 DIAGNOSIS — Z01.419 ENCOUNTER FOR GYNECOLOGICAL EXAMINATION (GENERAL) (ROUTINE) W/OUT ABNORMAL FINDINGS: ICD-10-CM

## 2024-04-02 DIAGNOSIS — N63.10 UNSPECIFIED LUMP IN THE RIGHT BREAST, UNSPECIFIED QUADRANT: ICD-10-CM

## 2024-04-02 DIAGNOSIS — N80.9 ENDOMETRIOSIS, UNSPECIFIED: ICD-10-CM

## 2024-04-02 PROCEDURE — 76856 US EXAM PELVIC COMPLETE: CPT

## 2024-04-02 PROCEDURE — 99395 PREV VISIT EST AGE 18-39: CPT

## 2024-04-02 RX ORDER — NORETHINDRONE ACETATE AND ETHINYL ESTRADIOL 5-7-9-7
1-20/1-30/1-35 KIT ORAL
Qty: 84 | Refills: 3 | Status: ACTIVE | COMMUNITY
Start: 2023-12-13 | End: 1900-01-01

## 2024-04-03 LAB
C TRACH RRNA SPEC QL NAA+PROBE: NOT DETECTED
N GONORRHOEA RRNA SPEC QL NAA+PROBE: NOT DETECTED
SOURCE AMPLIFICATION: NORMAL

## 2024-04-05 LAB — CYTOLOGY CVX/VAG DOC THIN PREP: NORMAL

## 2024-04-16 ENCOUNTER — APPOINTMENT (OUTPATIENT)
Dept: ULTRASOUND IMAGING | Facility: CLINIC | Age: 24
End: 2024-04-16
Payer: COMMERCIAL

## 2024-04-16 ENCOUNTER — APPOINTMENT (OUTPATIENT)
Dept: MAMMOGRAPHY | Facility: CLINIC | Age: 24
End: 2024-04-16

## 2024-04-16 ENCOUNTER — RESULT REVIEW (OUTPATIENT)
Age: 24
End: 2024-04-16

## 2024-04-16 PROCEDURE — 76642 ULTRASOUND BREAST LIMITED: CPT | Mod: RT

## 2024-04-16 PROCEDURE — 76536 US EXAM OF HEAD AND NECK: CPT

## 2024-04-23 ENCOUNTER — APPOINTMENT (OUTPATIENT)
Dept: SURGERY | Facility: CLINIC | Age: 24
End: 2024-04-23
Payer: COMMERCIAL

## 2024-04-23 DIAGNOSIS — E04.2 NONTOXIC MULTINODULAR GOITER: ICD-10-CM

## 2024-04-23 PROCEDURE — 99213 OFFICE O/P EST LOW 20 MIN: CPT

## 2024-04-23 PROCEDURE — G2211 COMPLEX E/M VISIT ADD ON: CPT

## 2024-04-23 NOTE — ASSESSMENT
[FreeTextEntry1] : Patientwith 10 year hx of MNG, minimal change in  MNG with prior FNA benign    f/u USstable.  repeat US 4/2025   RTo  1 year  I have answered their questions to the best of my ability.

## 2024-04-23 NOTE — PHYSICAL EXAM
[de-identified] : no cervical or supraclavicular adenopathy. trachea midline, thyroid full without discreet nodule.  [Normal] : no neck adenopathy [de-identified] : Skin:  normal appearance.  no rash, nodules, vesicles, or erythema,\par  Musculoskeletal:  full range of motion and no deformities appreciated\par  Neurological:  grossly intact\par  Psychiatric:  oriented to person, place and time with appropriate affect

## 2024-04-23 NOTE — HISTORY OF PRESENT ILLNESS
[de-identified] : Patient referred by Dr. Up for evaluation of MNG.  Patient with thyroid nodules noted on US 2012, no biopsy at that time.  Repeat US 2/2/18 right 5 mm nodule , left lower nodule 14 x 7 x 15 mm increased from prior study additional nodule noted. TSH 1.7 free T4 1.3, calcium 10.1.  Patient denies dysphagia, hoarseness or radiation exposure.   biopsy 2/2018 benign, f/u US 8/2018 stable MNG.  denies recent illness Patient with over 12-year hx of MNG with recent US 2/6/19 with MNG with increase in left lower nodule with suspicious characteristics. benign biopsy 2/2019, patient  had f/u US 5/13/2020  stable MNG .   f/u US 2/2022 with minimal change in dominant nodule and questionable LN. ultrasound May 2022 with stable nodule and decrease in lymph node. US 4/2024 no appreciable change. Denies recent illness.  I have reviewed all old and new data and available images.

## 2024-05-13 ENCOUNTER — APPOINTMENT (OUTPATIENT)
Dept: OBGYN | Facility: CLINIC | Age: 24
End: 2024-05-13

## 2024-05-14 ENCOUNTER — NON-APPOINTMENT (OUTPATIENT)
Age: 24
End: 2024-05-14

## 2024-05-14 DIAGNOSIS — N63.0 UNSPECIFIED LUMP IN UNSPECIFIED BREAST: ICD-10-CM

## 2024-07-31 ENCOUNTER — NON-APPOINTMENT (OUTPATIENT)
Age: 24
End: 2024-07-31

## 2024-08-02 ENCOUNTER — APPOINTMENT (OUTPATIENT)
Dept: OPHTHALMOLOGY | Facility: CLINIC | Age: 24
End: 2024-08-02
Payer: COMMERCIAL

## 2024-08-02 ENCOUNTER — NON-APPOINTMENT (OUTPATIENT)
Age: 24
End: 2024-08-02

## 2024-08-02 PROCEDURE — 92012 INTRM OPH EXAM EST PATIENT: CPT

## 2024-08-19 ENCOUNTER — APPOINTMENT (OUTPATIENT)
Dept: ULTRASOUND IMAGING | Facility: CLINIC | Age: 24
End: 2024-08-19
Payer: COMMERCIAL

## 2024-08-19 PROCEDURE — 19083 BX BREAST 1ST LESION US IMAG: CPT | Mod: RT

## 2024-08-19 PROCEDURE — A4648: CPT | Mod: 59

## 2025-07-01 ENCOUNTER — APPOINTMENT (OUTPATIENT)
Dept: SURGERY | Facility: CLINIC | Age: 25
End: 2025-07-01
Payer: COMMERCIAL

## 2025-07-01 ENCOUNTER — NON-APPOINTMENT (OUTPATIENT)
Age: 25
End: 2025-07-01

## 2025-07-01 PROCEDURE — 99213 OFFICE O/P EST LOW 20 MIN: CPT

## 2025-07-29 ENCOUNTER — OFFICE (OUTPATIENT)
Dept: URBAN - METROPOLITAN AREA CLINIC 109 | Facility: CLINIC | Age: 25
Setting detail: OPHTHALMOLOGY
End: 2025-07-29
Payer: COMMERCIAL

## 2025-07-29 DIAGNOSIS — H40.013: ICD-10-CM

## 2025-07-29 PROCEDURE — 76514 ECHO EXAM OF EYE THICKNESS: CPT | Performed by: OPHTHALMOLOGY

## 2025-07-29 PROCEDURE — 92004 COMPRE OPH EXAM NEW PT 1/>: CPT | Performed by: OPHTHALMOLOGY

## 2025-07-29 PROCEDURE — 92020 GONIOSCOPY: CPT | Performed by: OPHTHALMOLOGY

## 2025-07-29 PROCEDURE — 92250 FUNDUS PHOTOGRAPHY W/I&R: CPT | Performed by: OPHTHALMOLOGY

## 2025-07-29 PROCEDURE — 92133 CPTRZD OPH DX IMG PST SGM ON: CPT | Performed by: OPHTHALMOLOGY

## 2025-07-29 ASSESSMENT — REFRACTION_AUTOREFRACTION
OD_SPHERE: +6.50
OS_AXIS: 24
OD_AXIS: 4
OD_CYLINDER: -3.25
OS_CYLINDER: -3.00
OS_SPHERE: +3.25

## 2025-07-29 ASSESSMENT — PACHYMETRY
OD_CT_UM: 677
OS_CT_CORRECTION: >-7
OD_CT_CORRECTION: >-7
OS_CT_UM: 713

## 2025-07-29 ASSESSMENT — CONFRONTATIONAL VISUAL FIELD TEST (CVF)
OS_FINDINGS: FULL
OD_FINDINGS: FULL

## 2025-07-29 ASSESSMENT — TONOMETRY
OD_IOP_MMHG: 19
OS_IOP_MMHG: 19
OS_IOP_MMHG: 21

## 2025-07-29 ASSESSMENT — REFRACTION_MANIFEST
OS_VA1: 20/20
OS_CYLINDER: -2.50
OS_SPHERE: +3.25
OD_AXIS: 14
OS_AXIS: 29
OD_CYLINDER: -3.00
OD_VA1: 20/20
OD_SPHERE: +6.50

## 2025-07-29 ASSESSMENT — VISUAL ACUITY
OS_BCVA: 20/20
OD_BCVA: 20/20

## (undated) DEVICE — LAPSAC SURGICAL TISSUE POUCH 8X10"

## (undated) DEVICE — Device

## (undated) DEVICE — TROCAR APPLIED MEDICAL KII BALLOON BLUNT TIP 12MM X 100MM

## (undated) DEVICE — TROCAR COVIDIEN VERSASTEP PLUS 11MM STANDARD

## (undated) DEVICE — ADAPTER URETHRAL CATH CONNECTING

## (undated) DEVICE — DRAPE MAYO STAND 30"

## (undated) DEVICE — WARMING BLANKET FULL ADULT

## (undated) DEVICE — SPECIMEN CONTAINER 100ML

## (undated) DEVICE — MARKING PEN W RULER

## (undated) DEVICE — DRSG OPSITE 13.75 X 4"

## (undated) DEVICE — SUT VLOC 180 0 12" GS-21 GREEN

## (undated) DEVICE — FOLEY HOLDER STATLOCK 2 WAY ADULT

## (undated) DEVICE — GLV 7 PROTEXIS (WHITE)

## (undated) DEVICE — DRAPE 1/2 SHEET 40X57"

## (undated) DEVICE — SOL IRR POUR H2O 250ML

## (undated) DEVICE — DRAPE 3/4 SHEET W REINFORCEMENT 56X77"

## (undated) DEVICE — DRSG TEGADERM 6"X8"

## (undated) DEVICE — TUBING STRYKEFLOW II SUCTION / IRRIGATOR

## (undated) DEVICE — FOLEY TRAY 16FR 5CC LTX UMETER CLOSED

## (undated) DEVICE — VISITEC 4X4

## (undated) DEVICE — SOL IRR POUR NS 0.9% 500ML

## (undated) DEVICE — DRSG STERISTRIPS 0.5 X 4"

## (undated) DEVICE — DRAPE TOWEL BLUE 17" X 24"

## (undated) DEVICE — PREP CHLORAPREP HI-LITE ORANGE 26ML

## (undated) DEVICE — SUCTION YANKAUER NO CONTROL VENT

## (undated) DEVICE — TUBING INSUFFLATION LAP FILTER 10FT

## (undated) DEVICE — PREP BETADINE KIT

## (undated) DEVICE — ACMI SELF-SEALING SEAL UP TO 7FR

## (undated) DEVICE — PACK GYN LAPAROSCOPY

## (undated) DEVICE — LIGASURE BLUNT TIP 37CM

## (undated) DEVICE — TROCAR COVIDIEN VERSAONE BLADED FIXATION 11MM STANDARD

## (undated) DEVICE — DRAPE LIGHT HANDLE COVER (BLUE)

## (undated) DEVICE — ELCTR BOVIE PENCIL HANDPIECE

## (undated) DEVICE — STAPLER SKIN VISI-STAT 35 WIDE

## (undated) DEVICE — VALVE YELLOW PORT SEAL PLUS 5MM

## (undated) DEVICE — DRAPE MAGNETIC INSTRUMENT MEDIUM

## (undated) DEVICE — UTERINE MANIPULATOR CLINICAL INNOVATIONS CLEARVIEW 7CM

## (undated) DEVICE — GLV 7.5 PROTEXIS (WHITE)

## (undated) DEVICE — INSUFFLATION NDL COVIDIEN STEP 14G FOR STEP/VERSASTEP

## (undated) DEVICE — LAP PAD 18 X 18"

## (undated) DEVICE — UTERINE MANIPULATOR COOPER SURGICAL 5MM 33CM GREEN

## (undated) DEVICE — RUMI TIP BLUE 6.7MM X 8CM

## (undated) DEVICE — APPLICATOR ENDOSCOPIC FOR SUGIFLO

## (undated) DEVICE — VENODYNE/SCD SLEEVE CALF MEDIUM

## (undated) DEVICE — NDL COUNTER FOAM AND MAGNET 40-70

## (undated) DEVICE — MEDICATION LABELS W MARKER

## (undated) DEVICE — GLV 6.5 PROTEXIS (WHITE)

## (undated) DEVICE — BLADE SCALPEL SAFETYLOCK #15

## (undated) DEVICE — ENDOCATCH 10MM SPECIMEN POUCH

## (undated) DEVICE — PREP BETADINE SPONGE STICKS

## (undated) DEVICE — TUBING RANGER FLUID IRRIGATION SET DISP

## (undated) DEVICE — SOL IRR BAG H2O 3000ML

## (undated) DEVICE — TROCAR GELPOINT MINI ADVANCED

## (undated) DEVICE — SYR ASEPTO

## (undated) DEVICE — GLV 8 PROTEXIS (WHITE)

## (undated) DEVICE — DISSECTOR ENDO PEANUT 5MM

## (undated) DEVICE — DRAPE INSTRUMENT POUCH 6.75" X 11"

## (undated) DEVICE — BLADE SCALPEL SAFETYLOCK #10

## (undated) DEVICE — TUBING SUCTION 20FT

## (undated) DEVICE — WARMING BLANKET UPPER ADULT

## (undated) DEVICE — GOWN TRIMAX LG

## (undated) DEVICE — FOLEY TRAY 16FR LF URINE METER SURESTEP

## (undated) DEVICE — PACK CYSTO

## (undated) DEVICE — VENODYNE/SCD SLEEVE CALF LARGE

## (undated) DEVICE — APPLICATOR FOR ARISTA XL 38CM

## (undated) DEVICE — SUT VLOC 90 2-0 9" GS-22 UNDYED

## (undated) DEVICE — POSITIONER FOAM EGG CRATE ULNAR 2PCS (PINK)

## (undated) DEVICE — ELCTR HEX BLADE

## (undated) DEVICE — TUBING TUR 2 PRONG

## (undated) DEVICE — TROCAR COVIDIEN BLUNT TIP HASSAN 10MM

## (undated) DEVICE — GLV 8.5 PROTEXIS (WHITE)